# Patient Record
Sex: FEMALE | Race: WHITE | Employment: FULL TIME | URBAN - METROPOLITAN AREA
[De-identification: names, ages, dates, MRNs, and addresses within clinical notes are randomized per-mention and may not be internally consistent; named-entity substitution may affect disease eponyms.]

---

## 2022-06-20 ENCOUNTER — HOSPITAL ENCOUNTER (EMERGENCY)
Facility: CLINIC | Age: 42
Discharge: HOME | End: 2022-06-20
Attending: EMERGENCY MEDICINE | Admitting: EMERGENCY MEDICINE
Payer: COMMERCIAL

## 2022-06-20 VITALS
WEIGHT: 200 LBS | SYSTOLIC BLOOD PRESSURE: 120 MMHG | DIASTOLIC BLOOD PRESSURE: 78 MMHG | HEIGHT: 64 IN | HEART RATE: 76 BPM | TEMPERATURE: 96.8 F | BODY MASS INDEX: 34.15 KG/M2 | OXYGEN SATURATION: 99 % | RESPIRATION RATE: 18 BRPM

## 2022-06-20 DIAGNOSIS — Z20.2 EXPOSURE TO CHLAMYDIA: ICD-10-CM

## 2022-06-20 PROCEDURE — 250N000013 HC RX MED GY IP 250 OP 250 PS 637: Performed by: EMERGENCY MEDICINE

## 2022-06-20 PROCEDURE — 99283 EMERGENCY DEPT VISIT LOW MDM: CPT

## 2022-06-20 RX ORDER — DOXYCYCLINE 100 MG/1
100 CAPSULE ORAL 2 TIMES DAILY
Qty: 14 CAPSULE | Refills: 0 | Status: SHIPPED | OUTPATIENT
Start: 2022-06-20 | End: 2022-06-27

## 2022-06-20 RX ORDER — DOXYCYCLINE 100 MG/1
100 CAPSULE ORAL ONCE
Status: COMPLETED | OUTPATIENT
Start: 2022-06-20 | End: 2022-06-20

## 2022-06-20 RX ADMIN — DOXYCYCLINE HYCLATE 100 MG: 100 CAPSULE, GELATIN COATED ORAL at 11:41

## 2022-06-20 ASSESSMENT — ENCOUNTER SYMPTOMS
FEVER: 0
ABDOMINAL PAIN: 1
BLOOD IN STOOL: 1

## 2022-06-20 NOTE — ED PROVIDER NOTES
"  History   Chief Complaint:  Exposure to STD    The history is provided by the patient.      Natty Ortiz is a 42 year old female with history of chamydia who presents with exposure to STD. Patient reports condom breaking during intercourse and that she has developed abdominal pain and vaginal discharge. Adds that she has had black stools. Of note, patient and her partner were diagnosed with chlamydia 6 weeks ago and reportedly her partner was not fully treated. Denies fever.    Review of Systems   Constitutional: Negative for fever.   Gastrointestinal: Positive for abdominal pain and blood in stool.   Genitourinary: Positive for vaginal discharge.   All other systems reviewed and are negative.    Allergies:  Shellfish-Derived Products    Medications:  Ventolin inhaler    Past Medical History:     Chlamydia      Past Surgical History:    Spinal surgery    Social History:  Presents with significant other  Presents via private vehicle    Physical Exam     Patient Vitals for the past 24 hrs:   BP Temp Temp src Pulse Resp SpO2 Height Weight   06/20/22 0944 120/78 96.8  F (36  C) Temporal 76 18 99 % 1.626 m (5' 4\") 90.7 kg (200 lb)     Physical Exam  Eye:  Pupils are equal, round, and reactive.  Extraocular movements intact.    ENT:  No rhinorrhea.  Moist mucus membranes.  Normal tongue and tonsil.    Cardiac:  Regular rate and rhythm.  No murmurs, gallops, or rubs.    Pulmonary:  Clear to auscultation bilaterally.  No wheezes, rales, or rhonchi.    Abdomen:  Positive bowel sounds.  Abdomen is soft and non-distended, without focal tenderness.    Musculoskeletal:  Normal movement of all extremities without evidence for deficit.    Skin:  Warm and dry without rashes.    Neurologic:  Non-focal exam without asymmetric weakness or numbness.     Psychiatric:  Normal affect with appropriate interaction with examiner.    Emergency Department Course   Emergency Department Course:     Reviewed:  I reviewed nursing notes, " "vitals, past medical history and Care Everywhere    Assessments:  1056 I obtained history and examined the patient as noted above.   1119 I rechecked and updated the patient.     Interventions:  1141 Ceftriaxone, 500 mg, IM - REFUSED  1141 Doxycycline hyclate, 100 mg, PO    Disposition:  The patient was discharged to home.     Impression & Plan   Medical Decision Making:  This 42-year-old woman presents to us requesting treatment for chlamydia.  She states that she was diagnosed with this approximately 6 weeks ago as was her significant other.  He did not finish out his treatment and they were using condoms.  When the condom broke, she started to again experience symptoms of vaginal discharge concerning for return of chlamydia.  We discussed testing, but she prefers to simply be treated.  She does not have concerns for other STDs as she has not been with any other partners and is not concerned about her current partner being with anyone else.    On exam, she appears clinically well.  She has no significant abdominal pain and no fever.  I put in for treatment of both chlamydia and gonorrhea.  She refused the treatment with Rocephin due to \"feeling bad when I got it before.\"  She will instead receive doxycycline and continue a 1 week course of this.  She was advised to follow-up with her gynecologist, return to us for any worsening of condition or other emergent concerns.      Diagnosis:    ICD-10-CM    1. Exposure to chlamydia  Z20.2        Discharge Medications:  Discharge Medication List as of 6/20/2022 11:46 AM      START taking these medications    Details   doxycycline hyclate (VIBRAMYCIN) 100 MG capsule Take 1 capsule (100 mg) by mouth 2 times daily for 7 days, Disp-14 capsule, R-0, E-Prescribe             Scribe Disclosure:  Tiana CLOUD, am serving as a scribe at 10:53 AM on 6/20/2022 to document services personally performed by Trierweiler, Chad A, MD based on my observations and the provider's " statements to me.      Trierweiler, Chad A, MD  06/21/22 [id number]

## 2022-06-20 NOTE — ED TRIAGE NOTES
Pt requesting testing for chlamydia.  Reports  A recent exposure.     Triage Assessment     Row Name 06/20/22 1002       Triage Assessment (Adult)    Airway WDL WDL       Respiratory WDL    Respiratory WDL WDL       Skin Circulation/Temperature WDL    Skin Circulation/Temperature WDL WDL       Cardiac WDL    Cardiac WDL WDL       Peripheral/Neurovascular WDL    Peripheral Neurovascular WDL WDL       Cognitive/Neuro/Behavioral WDL    Cognitive/Neuro/Behavioral WDL WDL